# Patient Record
Sex: MALE | HISPANIC OR LATINO | Employment: FULL TIME | URBAN - METROPOLITAN AREA
[De-identification: names, ages, dates, MRNs, and addresses within clinical notes are randomized per-mention and may not be internally consistent; named-entity substitution may affect disease eponyms.]

---

## 2018-02-26 ENCOUNTER — APPOINTMENT (OUTPATIENT)
Dept: LAB | Facility: HOSPITAL | Age: 23
End: 2018-02-26

## 2018-02-26 ENCOUNTER — OFFICE VISIT (OUTPATIENT)
Dept: FAMILY MEDICINE CLINIC | Facility: CLINIC | Age: 23
End: 2018-02-26

## 2018-02-26 VITALS
HEIGHT: 65 IN | DIASTOLIC BLOOD PRESSURE: 90 MMHG | HEART RATE: 89 BPM | BODY MASS INDEX: 38.99 KG/M2 | SYSTOLIC BLOOD PRESSURE: 134 MMHG | OXYGEN SATURATION: 100 % | TEMPERATURE: 98.4 F | WEIGHT: 234 LBS

## 2018-02-26 DIAGNOSIS — Z00.00 GENERAL MEDICAL EXAM: Primary | ICD-10-CM

## 2018-02-26 DIAGNOSIS — N50.89 TESTICULAR LUMP: ICD-10-CM

## 2018-02-26 DIAGNOSIS — R10.32 ABDOMINAL PAIN, LLQ: ICD-10-CM

## 2018-02-26 DIAGNOSIS — Z13.220 SCREENING CHOLESTEROL LEVEL: ICD-10-CM

## 2018-02-26 LAB
ALBUMIN SERPL-MCNC: 4.6 G/DL (ref 3.2–4.8)
ALBUMIN/GLOB SERPL: 1.5 G/DL (ref 1.5–2.5)
ALP SERPL-CCNC: 70 U/L (ref 25–100)
ALT SERPL W P-5'-P-CCNC: 41 U/L (ref 7–40)
ANION GAP SERPL CALCULATED.3IONS-SCNC: 8 MMOL/L (ref 3–11)
ARTICHOKE IGE QN: 86 MG/DL (ref 0–130)
AST SERPL-CCNC: 27 U/L (ref 0–33)
BASOPHILS # BLD AUTO: 0.01 10*3/MM3 (ref 0–0.2)
BASOPHILS NFR BLD AUTO: 0.2 % (ref 0–1)
BILIRUB SERPL-MCNC: 0.5 MG/DL (ref 0.3–1.2)
BUN BLD-MCNC: 9 MG/DL (ref 9–23)
BUN/CREAT SERPL: 11.3 (ref 7–25)
CALCIUM SPEC-SCNC: 9.9 MG/DL (ref 8.7–10.4)
CHLORIDE SERPL-SCNC: 103 MMOL/L (ref 99–109)
CHOLEST SERPL-MCNC: 137 MG/DL (ref 0–200)
CO2 SERPL-SCNC: 26 MMOL/L (ref 20–31)
CREAT BLD-MCNC: 0.8 MG/DL (ref 0.6–1.3)
DEPRECATED RDW RBC AUTO: 43.1 FL (ref 37–54)
EOSINOPHIL # BLD AUTO: 0.04 10*3/MM3 (ref 0–0.3)
EOSINOPHIL NFR BLD AUTO: 0.8 % (ref 0–3)
ERYTHROCYTE [DISTWIDTH] IN BLOOD BY AUTOMATED COUNT: 13.4 % (ref 11.3–14.5)
GFR SERPL CREATININE-BSD FRML MDRD: 121 ML/MIN/1.73
GFR SERPL CREATININE-BSD FRML MDRD: 147 ML/MIN/1.73
GLOBULIN UR ELPH-MCNC: 3 GM/DL
GLUCOSE BLD-MCNC: 89 MG/DL (ref 70–100)
HCT VFR BLD AUTO: 43.7 % (ref 38.9–50.9)
HDLC SERPL-MCNC: 36 MG/DL (ref 40–60)
HGB BLD-MCNC: 15.1 G/DL (ref 13.1–17.5)
IMM GRANULOCYTES # BLD: 0.01 10*3/MM3 (ref 0–0.03)
IMM GRANULOCYTES NFR BLD: 0.2 % (ref 0–0.6)
LYMPHOCYTES # BLD AUTO: 2 10*3/MM3 (ref 0.6–4.8)
LYMPHOCYTES NFR BLD AUTO: 37.9 % (ref 24–44)
MCH RBC QN AUTO: 30.4 PG (ref 27–31)
MCHC RBC AUTO-ENTMCNC: 34.6 G/DL (ref 32–36)
MCV RBC AUTO: 88.1 FL (ref 80–99)
MONOCYTES # BLD AUTO: 0.43 10*3/MM3 (ref 0–1)
MONOCYTES NFR BLD AUTO: 8.1 % (ref 0–12)
NEUTROPHILS # BLD AUTO: 2.79 10*3/MM3 (ref 1.5–8.3)
NEUTROPHILS NFR BLD AUTO: 52.8 % (ref 41–71)
PLATELET # BLD AUTO: 285 10*3/MM3 (ref 150–450)
PMV BLD AUTO: 9.6 FL (ref 6–12)
POTASSIUM BLD-SCNC: 4.7 MMOL/L (ref 3.5–5.5)
PROT SERPL-MCNC: 7.6 G/DL (ref 5.7–8.2)
RBC # BLD AUTO: 4.96 10*6/MM3 (ref 4.2–5.76)
SODIUM BLD-SCNC: 137 MMOL/L (ref 132–146)
TRIGL SERPL-MCNC: 151 MG/DL (ref 0–150)
WBC NRBC COR # BLD: 5.28 10*3/MM3 (ref 3.5–10.8)

## 2018-02-26 PROCEDURE — 85025 COMPLETE CBC W/AUTO DIFF WBC: CPT | Performed by: PHYSICIAN ASSISTANT

## 2018-02-26 PROCEDURE — 36415 COLL VENOUS BLD VENIPUNCTURE: CPT | Performed by: PHYSICIAN ASSISTANT

## 2018-02-26 PROCEDURE — 80053 COMPREHEN METABOLIC PANEL: CPT | Performed by: PHYSICIAN ASSISTANT

## 2018-02-26 PROCEDURE — 99385 PREV VISIT NEW AGE 18-39: CPT | Performed by: PHYSICIAN ASSISTANT

## 2018-02-26 PROCEDURE — 80061 LIPID PANEL: CPT | Performed by: PHYSICIAN ASSISTANT

## 2018-02-26 NOTE — PROGRESS NOTES
Subjective   Tony Mccain is a 22 y.o. male  Establish Care (new patient establish care ); Annual Exam (Annual Physical Exam ); and Testicle Pain (Left testicular mass with pain )      History of Present Illness  Patient presents today as a new patient to our practice to establish care and for a preventive medical visit.  Patient is here to determine screening labs and tests that are due and to determine immunization status as well.  Patient will be counseled regarding preventative medicine issues such as regular exercise and  healthy diet as well.    The following portions of the patient's history were reviewed and updated as appropriate: allergies, current medications, past social history and problem list    Review of Systems   Constitutional: Positive for appetite change. Negative for chills, fever and unexpected weight change.   Respiratory: Negative for cough, chest tightness and shortness of breath.    Cardiovascular: Negative for chest pain.   Gastrointestinal: Positive for abdominal pain ( Patient states episodically has pain in the left lower quadrant of his abdomen extending to his groin.  This is been ongoing for the past 2 years.). Negative for abdominal distention, blood in stool, constipation, diarrhea, nausea and vomiting.   Genitourinary: Positive for scrotal swelling ( Patient states that he is episodically felt a swelling/not/mass behind his left testicle for the past 7 years he states it comes and goes.  He states he had an ultrasound years ago and wasn't told anything about the results.). Negative for difficulty urinating, dysuria and testicular pain.   Musculoskeletal: Negative for back pain.   Skin: Negative for color change and rash.   Allergic/Immunologic: Negative for food allergies.   All other systems reviewed and are negative.      Objective     Vitals:    02/26/18 1404   BP: 134/90   Pulse: 89   Temp: 98.4 °F (36.9 °C)   SpO2: 100%       Physical Exam   Constitutional: He is oriented  to person, place, and time. He appears well-developed and well-nourished.   HENT:   Head: Normocephalic and atraumatic.   Right Ear: External ear normal.   Left Ear: External ear normal.   Nose: Nose normal.   Mouth/Throat: Oropharynx is clear and moist.   Eyes: Conjunctivae and EOM are normal. Pupils are equal, round, and reactive to light. No scleral icterus.   Neck: Normal range of motion. Neck supple. No thyromegaly present.   Cardiovascular: Normal rate, regular rhythm, normal heart sounds and intact distal pulses.    No murmur heard.  Pulmonary/Chest: Effort normal and breath sounds normal. No respiratory distress. He has no wheezes.   Abdominal: Soft. Bowel sounds are normal. He exhibits no distension and no mass. There is no tenderness. There is no rebound and no guarding. No hernia.   Genitourinary: Penis normal.   Genitourinary Comments: Testicular exam normal, no masses or irregularities noted on testicular exam.  No hernia noted.   Musculoskeletal: Normal range of motion. He exhibits no edema.   Lymphadenopathy:     He has no cervical adenopathy.   Neurological: He is alert and oriented to person, place, and time. He has normal reflexes. No cranial nerve deficit. Coordination normal.   Skin: Skin is warm and dry. No rash noted. No erythema.   Psychiatric: He has a normal mood and affect. His behavior is normal.   Nursing note and vitals reviewed.  Discussed preventative medicine issues with patient including regular exercise, healthy diet, stress reduction, adequate sleep and recommended age-appropriate screening studies.      Assessment/Plan     Diagnoses and all orders for this visit:    General medical exam    Screening cholesterol level  -     Lipid Panel    Abdominal pain, LLQ  -     US Scrotum & Testicles; Future  -     CBC & Differential  -     Comprehensive Metabolic Panel  -     CBC Auto Differential    Testicular lump  -     US Scrotum & Testicles; Future    Will have patient follow-up with me  in one week for recheck to review labs and ultrasound and to reevaluate chronic/episodic abdominal pain.

## 2018-03-06 ENCOUNTER — HOSPITAL ENCOUNTER (OUTPATIENT)
Dept: ULTRASOUND IMAGING | Facility: HOSPITAL | Age: 23
Discharge: HOME | End: 2018-03-06
Admitting: PHYSICIAN ASSISTANT

## 2018-03-06 DIAGNOSIS — N50.89 TESTICULAR LUMP: ICD-10-CM

## 2018-03-06 DIAGNOSIS — R10.32 ABDOMINAL PAIN, LLQ: ICD-10-CM

## 2018-03-06 PROCEDURE — 76870 US EXAM SCROTUM: CPT

## 2018-03-21 ENCOUNTER — OFFICE VISIT (OUTPATIENT)
Dept: FAMILY MEDICINE CLINIC | Facility: CLINIC | Age: 23
End: 2018-03-21

## 2018-03-21 VITALS
WEIGHT: 234 LBS | DIASTOLIC BLOOD PRESSURE: 62 MMHG | TEMPERATURE: 98.2 F | SYSTOLIC BLOOD PRESSURE: 124 MMHG | HEIGHT: 65 IN | HEART RATE: 90 BPM | BODY MASS INDEX: 38.99 KG/M2 | OXYGEN SATURATION: 99 %

## 2018-03-21 DIAGNOSIS — J06.9 URI, ACUTE: Primary | ICD-10-CM

## 2018-03-21 DIAGNOSIS — S76.212A GROIN STRAIN, LEFT, INITIAL ENCOUNTER: ICD-10-CM

## 2018-03-21 PROCEDURE — 99213 OFFICE O/P EST LOW 20 MIN: CPT | Performed by: PHYSICIAN ASSISTANT

## 2018-03-21 RX ORDER — AMOXICILLIN 875 MG/1
875 TABLET, COATED ORAL EVERY 12 HOURS SCHEDULED
Qty: 14 TABLET | Refills: 0 | Status: SHIPPED | OUTPATIENT
Start: 2018-03-21

## 2018-03-21 NOTE — PROGRESS NOTES
Subjective   Tony Mccain is a 22 y.o. male  Testicle Pain (follow up on testicular pain) and Nasal Congestion (Nasal congestion with green mucus x3 days )      History of Present Illness  Patient comes in today for follow-up regarding recent testicular ultrasound as well as ongoing chronic pain in left groin for 2 years.  Additionally has a new problem he has been experiencing symptoms of congestion with green nasal congestion and sinus pressure for the past several days.  He states he has no fever, some postnasal drainage and scratchy throat.  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  Regarding his groin pain and denies any difficulty urinating, no blood in urine, bowel movements are normal.  The following portions of the patient's history were reviewed and updated as appropriate: allergies, current medications, past social history and problem list    Review of Systems   Constitutional: Positive for appetite change. Negative for chills, fever and unexpected weight change.   HENT: Positive for congestion, postnasal drip, rhinorrhea, sneezing, sore throat and voice change. Negative for sinus pressure.    Respiratory: Positive for cough. Negative for chest tightness and shortness of breath.    Cardiovascular: Negative for chest pain.   Gastrointestinal: Positive for abdominal pain. Negative for abdominal distention, blood in stool, constipation, diarrhea, nausea and vomiting.        Patient states the pain and discomfort he has felt on and off for the past couple of years starts in his groin on the left side and radiates up his groin and left lower quadrant   Genitourinary: Negative for difficulty urinating, dysuria, penile pain, penile swelling, scrotal swelling and testicular pain.   Musculoskeletal: Negative for back pain.   Skin: Negative for color change and rash.   Allergic/Immunologic: Negative for food allergies.       Objective     Vitals:    03/21/18 0816   BP: 124/62   Pulse: 90   Temp:  98.2 °F (36.8 °C)   SpO2: 99%       Physical Exam   Constitutional: He appears well-developed and well-nourished.   HENT:   Head: Normocephalic and atraumatic.   Right Ear: Tympanic membrane and ear canal normal.   Left Ear: Tympanic membrane and ear canal normal.   Nose: Mucosal edema, rhinorrhea and sinus tenderness present. Right sinus exhibits maxillary sinus tenderness and frontal sinus tenderness. Left sinus exhibits maxillary sinus tenderness and frontal sinus tenderness.   Mouth/Throat: Oropharynx is clear and moist. No oropharyngeal exudate.   Eyes: Pupils are equal, round, and reactive to light.   Cardiovascular: Normal rate and regular rhythm.    Pulmonary/Chest: Effort normal and breath sounds normal.   Abdominal: He exhibits no distension and no mass. There is no tenderness.   Musculoskeletal: Normal range of motion. He exhibits no tenderness.   Nursing note and vitals reviewed.      Assessment/Plan     Diagnoses and all orders for this visit:    URI, acute    Groin strain, left, initial encounter  -     Ambulatory Referral to Physical Therapy Evaluate and treat    Other orders  -     Multiple Vitamins-Minerals (MULTIVITAL PO); Take  by mouth.  -     amoxicillin (AMOXIL) 875 MG tablet; Take 1 tablet by mouth Every 12 (Twelve) Hours.

## 2018-04-19 ENCOUNTER — HOSPITAL ENCOUNTER (OUTPATIENT)
Dept: PHYSICAL THERAPY | Facility: HOSPITAL | Age: 23
Setting detail: THERAPIES SERIES
Discharge: HOME | End: 2018-04-19

## 2018-04-19 DIAGNOSIS — N50.812 TESTICULAR PAIN, LEFT: Primary | ICD-10-CM

## 2018-04-19 PROCEDURE — 97161 PT EVAL LOW COMPLEX 20 MIN: CPT

## 2018-04-20 NOTE — THERAPY EVALUATION
Outpatient Physical Therapy Ortho Initial Evaluation  [facility]     Patient Name: Tony Mccain  : 1995  MRN: [id number]  Today's Date: 2018      Visit Date: 2018    There is no problem list on file for this patient.       History reviewed. No pertinent past medical history.     History reviewed. No pertinent surgical history.    Visit Dx:     ICD-10-CM ICD-9-CM   1. Testicular pain, left N50.812 608.9             Patient History     Row Name 18 0800             History    Chief Complaint Pain  -GB      Type of Pain Other pain   Testicular/Groin  -GB      Date Current Problem(s) Began --   ~ 5 years  -GB      Brief Description of Current Complaint Patient has experienced an increasing frequency of groin/testicular pain.  He reports that within the past year the frequency of this pain has increased,   described as strong dull ache with sensitivity to touch.  An episode will last up to one hour or longer.  He notices no regularity with cause or onset of pain.  Most recent episode was approximately 1-2 weeks ago.  Episodes occur weekly on average.  -GB      Patient/Caregiver Goals Relieve pain   Discover underlying cause  -GB      Patient's Rating of General Health Good  -GB      Occupation/sports/leisure activities He is employed as a medical assistant for a Pediatrician.  He enjoys hiking, and shopping.  -GB         Pain     Pain Location --   Left Testicular  -GB      Pain at Present 0  -GB      Pain at Best 0  -GB      Pain at Worst 8  -GB         Fall Risk Assessment    Any falls in the past year: No  -GB         Daily Activities    Primary Language English  -GB      Barriers to learning Visual  -GB      Pt Participated in POC and Goals Yes  -GB         Safety    Are you being hurt, hit, or frightened by anyone at home or in your life? No  -GB        User Key  (r) = Recorded By, (t) = Taken By, (c) = Cosigned By    Initials Name Provider Type    GERALDINE Bullock, PT Physical  Therapist                PT Ortho     Row Name 04/19/18 1900       Hip/Thigh Palpation    Hip/Thigh Palpation? --  -GB    Row Name 04/19/18 1300       Precautions and Contraindications    Precautions/Limitations no known precautions/limitations  -GB       Special Tests/Palpation    Special Tests/Palpation Hip  -GB       Hip/Thigh Palpation    Iliopsoas Left:;Tender   and along Rectus Femoris  -GB    Semimembranosis --   No Tenderness  -GB    Semitendinosis --   No Tenderness  -GB    Adductors --   No Tenderness  -GB    ASIS --   No Tenderness  -GB    Sartorius --   No Tenderness  -GB    Gracilis --   No Tenderness  -GB    SI --   No Tenderness  -GB    Hip/Thigh Palpation? Yes  -GB       Hip Special Tests    AUGIE (hip vs SI pathology) Left:;Negative  -GB    Valery’s test (tightness of ITB) Left:;Negative  -GB    Ely’s test (rectus femoris tightness) Left:;Negative  -GB    Hip scour test (labral vs hip pathology) Left:;Negative  -GB       Leg Length Test    Apparent Unequal;Left Higher Leg   Minimal left posterior rotation of Ilium  -GB       General ROM    GENERAL ROM COMMENTS All planes of LEs and Hip are WNL  -GB       MMT (Manual Muscle Testing)    Additional Documentation General Assessment (Manual Muscle Testing) (Group)  -GB       General Assessment (Manual Muscle Testing)    General Manual Muscle Testing (MMT) Assessment no strength deficits identified  -GB    Comment, General Manual Muscle Testing (MMT) Assessment Sartorius, Hip adductors, and Gracilis are all WNL, without pain  -GB       Flexibility    Flexibility Tested? Lower Extremity  -GB       Lower Extremity Flexibility    Hamstrings WNL  -GB    Hip Adductors WNL  -GB      User Key  (r) = Recorded By, (t) = Taken By, (c) = Cosigned By    Initials Name Provider Type    GB Rm Bullock PT Physical Therapist                                  PT OP Goals     Row Name 04/19/18 2000          PT Short Term Goals    STG Date to Achieve 05/03/18  -GB      STG 1 Patient is independent with HEP for flexibility and self mobilization.  -GB     STG 1 Progress New  -GB        Long Term Goals    LTG Date to Achieve 07/16/18  -GB     LTG 1 Patient reports of pain decrease by 50% with frequency or intensity of symptoms.  -GB     LTG 1 Progress New  -GB     LTG 2 Patient is without tenderness to palpation at inguinal area or increased sensitivity when prone.  -GB     LTG 2 Progress New  -GB        Time Calculation    PT Goal Re-Cert Due Date 07/16/18  -GB       User Key  (r) = Recorded By, (t) = Taken By, (c) = Cosigned By    Initials Name Provider Type    GB Rm Bullock, PT Physical Therapist                PT Assessment/Plan     Row Name 04/19/18 2043          PT Assessment    Functional Limitations Performance in self-care ADL;Performance in leisure activities;Limitations in functional capacity and performance;Limitations in community activities;Limitation in home management  -GB     Impairments Sensation;Pain  -GB     Assessment Comments Tony presents with an unusual presentation which is not recreated with assessment of hip adductor/groin musculature.  He does have some issues with sensitivity in inguinal area which closely replicate testicular and saddle area painl.  He likely would benefit from specialist consulation to address sensitivy in that area.  -GB     Please refer to paper survey for additional self-reported information Yes  -GB     Rehab Potential Fair  -GB     Patient/caregiver participated in establishment of treatment plan and goals Yes  -GB     Patient would benefit from skilled therapy intervention Yes  -GB        PT Plan    PT Frequency 1x/week  -GB     Predicted Duration of Therapy Intervention (OT Eval) 6 weeks  -GB     Planned CPT's? PT EVAL LOW COMPLEXITY: 45947;PT RE-EVAL: 28057;PT NEUROMUSC RE-EDUCATION EA 15 MIN: 79683;PT MANUAL THERAPY EA 15 MIN: 01895;PT THER PROC EA 15 MIN: 94964  -GB     PT Plan Comments Follow up with care in 2  weeks.  Discuss findings with referring provider to confirm appropriate course of action.  -GERALDINE       User Key  (r) = Recorded By, (t) = Taken By, (c) = Cosigned By    Initials Name Provider Type    GERALDINE Bullock, PT Physical Therapist                              Outcome Measure Options: Lower Extremity Functional Scale (LEFS)  Lower Extremity Functional Index  Any of your usual work, housework or school activities: No difficulty  Your usual hobbies, recreational or sporting activities: No difficulty  Getting into or out of the bath: No difficulty  Walking between rooms: No difficulty  Putting on your shoes or socks: No difficulty  Squatting: No difficulty  Lifting an object, like a bag of groceries from the floor: No difficulty  Performing light activities around your home: No difficulty  Performing heavy activities around your home: No difficulty  Getting into or out of a car: No difficulty  Walking 2 blocks: No difficulty  Walking a mile: No difficulty  Going up or down 10 stairs (about 1 flight of stairs): No difficulty  Standing for 1 hour: No difficulty  Sitting for 1 hour: No difficulty  Running on even ground: No difficulty  Running on uneven ground: No difficulty  Making sharp turns while running fast: No difficulty  Hopping: No difficulty  Rolling over in bed: No difficulty  Total: 80      Time Calculation:   Start Time: 0845     Therapy Charges for Today     Code Description Service Date Service Provider Modifiers Qty    43190312561  PT EVAL LOW COMPLEXITY 3 4/19/2018 Rm Bullock, PT GP 1          PT Delmy  Outcome Measure Options: Lower Extremity Functional Scale (LEFS)         Rm Bullock, PT  4/19/2018

## 2022-11-09 ENCOUNTER — TELEPHONE (OUTPATIENT)
Dept: INTERNAL MEDICINE | Facility: CLINIC | Age: 27
End: 2022-11-09

## 2022-11-09 NOTE — TELEPHONE ENCOUNTER
Caller: Tony Mccain    Relationship to patient: Self    Best call back number: [phone]     Chief complaint: NOT ACCEPTING NEW PATIENTS    Type of visit: NEW PATIENT    Requested date: AS SOON AS POSSIBLE    Additional notes: PATIENT HAS RETURNED TO [facility] AND IN SEARCH OF A NEW PCP. DR CHAVEZ CAME HIGHLY RECOMMENDED FROM A FRIEND AND HE WOULD LIKE TO BECOME ESTABLISHED WITH HIM.

## 2024-07-22 ENCOUNTER — OFFICE VISIT (OUTPATIENT)
Dept: INTERNAL MEDICINE | Facility: CLINIC | Age: 29
End: 2024-07-22
Payer: COMMERCIAL

## 2024-07-22 ENCOUNTER — APPOINTMENT (OUTPATIENT)
Dept: GENERAL RADIOLOGY | Facility: HOSPITAL | Age: 29
End: 2024-07-22
Payer: COMMERCIAL

## 2024-07-22 ENCOUNTER — LAB (OUTPATIENT)
Dept: LAB | Facility: HOSPITAL | Age: 29
End: 2024-07-22
Payer: COMMERCIAL

## 2024-07-22 VITALS
WEIGHT: 216.8 LBS | BODY MASS INDEX: 34.84 KG/M2 | HEART RATE: 60 BPM | HEIGHT: 66 IN | TEMPERATURE: 98 F | DIASTOLIC BLOOD PRESSURE: 84 MMHG | SYSTOLIC BLOOD PRESSURE: 128 MMHG

## 2024-07-22 DIAGNOSIS — J30.1 SEASONAL ALLERGIC RHINITIS DUE TO POLLEN: Chronic | ICD-10-CM

## 2024-07-22 DIAGNOSIS — R03.0 ELEVATED BLOOD PRESSURE READING: ICD-10-CM

## 2024-07-22 DIAGNOSIS — E66.9 OBESITY, CLASS I, BMI 30-34.9: ICD-10-CM

## 2024-07-22 DIAGNOSIS — Z11.59 NEED FOR HEPATITIS C SCREENING TEST: ICD-10-CM

## 2024-07-22 DIAGNOSIS — Z11.3 SCREENING EXAMINATION FOR STI: ICD-10-CM

## 2024-07-22 DIAGNOSIS — Z11.4 ENCOUNTER FOR SCREENING FOR HIV: ICD-10-CM

## 2024-07-22 DIAGNOSIS — E78.2 MIXED HYPERLIPIDEMIA: Chronic | ICD-10-CM

## 2024-07-22 DIAGNOSIS — S49.91XD INJURY OF RIGHT SHOULDER, SUBSEQUENT ENCOUNTER: ICD-10-CM

## 2024-07-22 DIAGNOSIS — Z13.1 SCREENING FOR DIABETES MELLITUS: ICD-10-CM

## 2024-07-22 DIAGNOSIS — S49.91XD ACROMIOCLAVICULAR JOINT INJURY, RIGHT, SUBSEQUENT ENCOUNTER: Primary | ICD-10-CM

## 2024-07-22 LAB
ALBUMIN SERPL-MCNC: 4.7 G/DL (ref 3.5–5.2)
ALBUMIN/GLOB SERPL: 1.6 G/DL
ALP SERPL-CCNC: 60 U/L (ref 39–117)
ALT SERPL W P-5'-P-CCNC: 29 U/L (ref 1–41)
ANION GAP SERPL CALCULATED.3IONS-SCNC: 12 MMOL/L (ref 5–15)
AST SERPL-CCNC: 21 U/L (ref 1–40)
BILIRUB SERPL-MCNC: 0.5 MG/DL (ref 0–1.2)
BUN SERPL-MCNC: 11 MG/DL (ref 6–20)
BUN/CREAT SERPL: 11.7 (ref 7–25)
CALCIUM SPEC-SCNC: 9.5 MG/DL (ref 8.6–10.5)
CHLORIDE SERPL-SCNC: 103 MMOL/L (ref 98–107)
CHOLEST SERPL-MCNC: 157 MG/DL (ref 0–200)
CO2 SERPL-SCNC: 25 MMOL/L (ref 22–29)
CREAT SERPL-MCNC: 0.94 MG/DL (ref 0.76–1.27)
EGFRCR SERPLBLD CKD-EPI 2021: 112.5 ML/MIN/1.73
GLOBULIN UR ELPH-MCNC: 3 GM/DL
GLUCOSE SERPL-MCNC: 85 MG/DL (ref 65–99)
HBA1C MFR BLD: 5.3 % (ref 4.8–5.6)
HCV AB SER QL: NORMAL
HDLC SERPL-MCNC: 51 MG/DL (ref 40–60)
HIV 1+2 AB+HIV1 P24 AG SERPL QL IA: NORMAL
LDLC SERPL CALC-MCNC: 89 MG/DL (ref 0–100)
LDLC/HDLC SERPL: 1.72 {RATIO}
POTASSIUM SERPL-SCNC: 4.4 MMOL/L (ref 3.5–5.2)
PROT SERPL-MCNC: 7.7 G/DL (ref 6–8.5)
SODIUM SERPL-SCNC: 140 MMOL/L (ref 136–145)
TRIGL SERPL-MCNC: 92 MG/DL (ref 0–150)
TSH SERPL DL<=0.05 MIU/L-ACNC: 1.44 UIU/ML (ref 0.27–4.2)
VLDLC SERPL-MCNC: 17 MG/DL (ref 5–40)

## 2024-07-22 PROCEDURE — 84443 ASSAY THYROID STIM HORMONE: CPT

## 2024-07-22 PROCEDURE — 87591 N.GONORRHOEAE DNA AMP PROB: CPT

## 2024-07-22 PROCEDURE — 80053 COMPREHEN METABOLIC PANEL: CPT

## 2024-07-22 PROCEDURE — 86803 HEPATITIS C AB TEST: CPT

## 2024-07-22 PROCEDURE — 80061 LIPID PANEL: CPT

## 2024-07-22 PROCEDURE — 83036 HEMOGLOBIN GLYCOSYLATED A1C: CPT

## 2024-07-22 PROCEDURE — 87491 CHLMYD TRACH DNA AMP PROBE: CPT

## 2024-07-22 PROCEDURE — G0432 EIA HIV-1/HIV-2 SCREEN: HCPCS

## 2024-07-22 PROCEDURE — 86592 SYPHILIS TEST NON-TREP QUAL: CPT

## 2024-07-22 PROCEDURE — 99204 OFFICE O/P NEW MOD 45 MIN: CPT | Performed by: STUDENT IN AN ORGANIZED HEALTH CARE EDUCATION/TRAINING PROGRAM

## 2024-07-22 RX ORDER — MELATONIN 5 MG
1 LOZENGE ORAL AS NEEDED
COMMUNITY
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
## 2024-07-23 LAB
C TRACH RRNA SPEC QL NAA+PROBE: NEGATIVE
N GONORRHOEA RRNA SPEC QL NAA+PROBE: NEGATIVE
RPR SER QL: NORMAL